# Patient Record
Sex: MALE | Race: OTHER | HISPANIC OR LATINO | URBAN - METROPOLITAN AREA
[De-identification: names, ages, dates, MRNs, and addresses within clinical notes are randomized per-mention and may not be internally consistent; named-entity substitution may affect disease eponyms.]

---

## 2023-08-04 ENCOUNTER — EMERGENCY (EMERGENCY)
Facility: HOSPITAL | Age: 28
LOS: 0 days | Discharge: HOME | End: 2023-08-04
Attending: EMERGENCY MEDICINE
Payer: SELF-PAY

## 2023-08-04 VITALS
TEMPERATURE: 98 F | RESPIRATION RATE: 18 BRPM | HEART RATE: 69 BPM | SYSTOLIC BLOOD PRESSURE: 112 MMHG | OXYGEN SATURATION: 99 % | DIASTOLIC BLOOD PRESSURE: 57 MMHG

## 2023-08-04 DIAGNOSIS — S61.210A LACERATION WITHOUT FOREIGN BODY OF RIGHT INDEX FINGER WITHOUT DAMAGE TO NAIL, INITIAL ENCOUNTER: ICD-10-CM

## 2023-08-04 DIAGNOSIS — J45.909 UNSPECIFIED ASTHMA, UNCOMPLICATED: ICD-10-CM
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
## 2023-08-04 PROCEDURE — 73140 X-RAY EXAM OF FINGER(S): CPT | Mod: RT

## 2023-08-04 PROCEDURE — 99283 EMERGENCY DEPT VISIT LOW MDM: CPT | Mod: 25

## 2023-08-04 PROCEDURE — 99284 EMERGENCY DEPT VISIT MOD MDM: CPT | Mod: 25

## 2023-08-04 PROCEDURE — 12001 RPR S/N/AX/GEN/TRNK 2.5CM/<: CPT

## 2023-08-04 PROCEDURE — 73140 X-RAY EXAM OF FINGER(S): CPT | Mod: 26,RT

## 2023-08-04 RX ORDER — ACETAMINOPHEN 500 MG
650 TABLET ORAL ONCE
Refills: 0 | Status: COMPLETED | OUTPATIENT
Start: 2023-08-04 | End: 2023-08-04

## 2023-08-04 RX ADMIN — Medication 650 MILLIGRAM(S): at 10:55

## 2023-08-04 NOTE — ED ADULT TRIAGE NOTE - HEART RATE (BEATS/MIN)
Dr,Reyes now is wondering if there is a substitute blood thinner instead of being taken patient off totally.? He has been on Lovenox before and still has a few left that Andrei Simmons pre scripted for him.  Patient is also getting his teeth pulled in by Lee Ann Simmons [phone] and will be needing a dental letter of clearance for this as well.,   71

## 2023-08-04 NOTE — ED PROVIDER NOTE - NSFOLLOWUPINSTRUCTIONS_ED_ALL_ED_FT
Jammed Finger    A jammed finger is an injury to the ligaments that support your finger bones. Ligaments are strong bands of tissue that connect bones and keep them in place. This injury happens when the ligaments are stretched beyond their normal range of motion (sprained).    CAUSES  A jammed finger is caused by a hard direct hit to the tip of your finger that pushes your finger toward your hand.     RISK FACTORS  This injury is more likely to happen if you play sports.    SYMPTOMS  Symptoms of a jammed finger include:    Pain.  Swelling.  Discoloration and bruising around the joint.  Difficulty bending or straightening the finger.  Not being able to use the finger normally.    DIAGNOSIS  A jammed finger is diagnosed with a medical history and physical exam. You may also have X-rays taken to check for a broken bone (fracture).     TREATMENT  Treatment for a jammed finger may include:    Wearing a splint.  Taping the injured finger to the fingers beside it (anil taping).   Medicines used to treat pain.    Depending on the type of injury, you may have to do exercises after your finger has begun to heal. This helps you regain strength and mobility in the finger.     HOME CARE INSTRUCTIONS  Take medicines only as directed by your health care provider.   Apply ice to the injured area:    Put ice in a plastic bag.    Place a towel between your skin and the bag.    Leave the ice on for 20 minutes, 2–3 times per day.   Raise the injured area above the level of your heart while you are sitting or lying down.  Wear the splint or tape as directed by your health care provider. Remove it only as directed by your health care provider.   Rest your finger until your health care provider says you can move it again. Your finger may feel stiff and painful for a while.  Perform strengthening exercises as directed by your health care provider. It may help to start doing these exercises with your hand in a bowl of warm water.  Keep all follow-up visits as directed by your health care provider. This is important.    SEEK MEDICAL CARE IF:  You have pain or swelling that is getting worse.  Your finger feels cold.  Your finger looks out of place at the joint (deformity).  You still cannot extend your finger after treatment.  You have a fever.    SEEK IMMEDIATE MEDICAL CARE IF:  Even after loosening your splint, your finger:  Is very red and swollen.  Is white or blue.  Feels tingly or becomes numb.    ADDITIONAL NOTES AND INSTRUCTIONS    Please follow up with your Primary MD in 24-48 hr.  Seek immediate medical care for any new/worsening signs or symptoms.

## 2023-08-04 NOTE — ED PROVIDER NOTE - PATIENT PORTAL LINK FT
You can access the FollowMyHealth Patient Portal offered by [facility] by registering at the following website: [email or web address]. By joining [facility]’s FollowMyHealth portal, you will also be able to view your health information using other applications (apps) compatible with our system.

## 2023-08-04 NOTE — ED PROVIDER NOTE - OBJECTIVE STATEMENT
27-year-old male, with past medical history of asthma, presents to the emergency department with laceration to his right second digit.  Patient was working a  at work at 9 AM this morning when he cut his finger.  There is bleeding which is now controlled.  Patient is right-handed dominant.  He is up-to-date on his tetanus.  Denies numbness, tingling, weakness.

## 2023-08-04 NOTE — ED PROVIDER NOTE - CLINICAL SUMMARY MEDICAL DECISION MAKING FREE TEXT BOX
Endorsed from Dr. Azevedo  Laceration to R index finger from slicer  After irrigation, pt w/ superifical lac to finger. no active bleeding. Good cap refill. sensation intact distally  Will repair with steri and dermabond, update Td if needed

## 2023-08-04 NOTE — ED ADULT NURSE NOTE - NSFALLUNIVINTERV_ED_ALL_ED
Bed/Stretcher in lowest position, wheels locked, appropriate side rails in place/Call bell, personal items and telephone in reach/Instruct patient to call for assistance before getting out of bed/chair/stretcher/Non-slip footwear applied when patient is off stretcher/[place] to call system/Physically safe environment - no spills, clutter or unnecessary equipment/Purposeful proactive rounding/Room/bathroom lighting operational, light cord in reach

## 2023-08-04 NOTE — ED PROVIDER NOTE - NSFOLLOWUPCLINICS_GEN_ALL_ED_FT
[facility]  Medicine  [address], [place]   Phone: [phone]  Fax:   Follow Up Time: 4-6 Days

## 2023-08-04 NOTE — ED PROVIDER NOTE - PHYSICAL EXAMINATION
GENERAL: Well-developed; well-nourished; in no acute distress.   SKIN: warm, dry, 1.5 superficial laceration to dorsal R 2nd digit at distal phalange, 1.5cm superficial laceration to ventral R 2nd digit distal phalange  HEAD: Normocephalic; atraumatic.  cardiac: 2/4 radial pulses bilaterally   EXT: Normal ROM.  No LE TTP or edema bilaterally.  NEURO: Alert, oriented, sensation intact, 5/5x4 strength   PSYCH: Cooperative, appropriate.

## 2023-08-04 NOTE — ED PROVIDER NOTE - PROGRESS NOTE DETAILS
case endorsed to Dr. Lopez. AB: After cleaning off his laceration, it was repaired with steri strips and dermabond

## 2023-08-07 NOTE — ED PROCEDURE NOTE - ATTENDING CONTRIBUTION TO CARE
I personally supervised the study. I reviewed the images and interpretation by the resident/ACP and have edited wherer appropriate.

## 2024-05-20 NOTE — ED ADULT TRIAGE NOTE - RESPIRATORY RATE (BREATHS/MIN)
18
Quality 226: Preventive Care And Screening: Tobacco Use: Screening And Cessation Intervention: Patient screened for tobacco use, is a smoker AND received Cessation Counseling within measurement period or in the six months prior to the measurement period
Quality 47: Advance Care Plan: Advance Care Planning discussed and documented in the medical record; patient did not wish or was not able to name a surrogate decision maker or provide an advance care plan.
Detail Level: Detailed